# Patient Record
Sex: FEMALE | Race: WHITE | NOT HISPANIC OR LATINO | Employment: PART TIME | URBAN - METROPOLITAN AREA
[De-identification: names, ages, dates, MRNs, and addresses within clinical notes are randomized per-mention and may not be internally consistent; named-entity substitution may affect disease eponyms.]

---

## 2018-05-06 ENCOUNTER — APPOINTMENT (OUTPATIENT)
Dept: RADIOLOGY | Facility: CLINIC | Age: 43
End: 2018-05-06
Payer: COMMERCIAL

## 2018-05-06 ENCOUNTER — OFFICE VISIT (OUTPATIENT)
Dept: URGENT CARE | Facility: CLINIC | Age: 43
End: 2018-05-06
Payer: COMMERCIAL

## 2018-05-06 VITALS
RESPIRATION RATE: 16 BRPM | SYSTOLIC BLOOD PRESSURE: 114 MMHG | WEIGHT: 149.8 LBS | OXYGEN SATURATION: 100 % | HEART RATE: 83 BPM | TEMPERATURE: 98.7 F | DIASTOLIC BLOOD PRESSURE: 68 MMHG | HEIGHT: 63 IN | BODY MASS INDEX: 26.54 KG/M2

## 2018-05-06 DIAGNOSIS — S69.92XA INJURY OF LEFT RING FINGER, INITIAL ENCOUNTER: ICD-10-CM
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
## 2018-05-06 DIAGNOSIS — S69.92XA INJURY OF LEFT RING FINGER, INITIAL ENCOUNTER: Primary | ICD-10-CM

## 2018-05-06 PROCEDURE — 99203 OFFICE O/P NEW LOW 30 MIN: CPT | Performed by: PHYSICIAN ASSISTANT

## 2018-05-06 PROCEDURE — 73140 X-RAY EXAM OF FINGER(S): CPT

## 2018-05-06 NOTE — PROGRESS NOTES
[facility]        NAME: Vonnie Ruiz is a 38 y o  female  : 1975    MRN: [id number]  DATE: May 6, 2018  TIME: 9:34 AM    Assessment and Plan   Injury of left ring finger, initial encounter [[id number]]  1  Injury of left ring finger, initial encounter  XR finger left fourth digit-ring    Ambulatory referral to Orthopedic Surgery   2  Injury due to impact of moving object with stationary subject  XR finger left fourth digit-ring    Ambulatory referral to Orthopedic Surgery         Patient Instructions     Discussed condition with pt  Her X-ray appears negative for fracture or dislocation  I rec that she take NSAIDs for residual swelling, practice gentle AROM exercises throughout the day, and I gave her contact info to schedule consult with ortho should this not completely resolve  I also gave her handout to establish with local PCP in the area  Follow up with PCP in 3-5 days  Proceed to  ER if symptoms worsen  Chief Complaint     Chief Complaint   Patient presents with    Hand Pain     Pt here  for pain  in left ring finger x 1month, Pt was playing football  Pt states it was very swollen  but now it is still swollen and tender,  not able to bend all the way  History of Present Illness       Pt presents with what she reports is a left ring finger injury which occurred 1 month ago  Was playing football with her son and the ball hit her on the dorsal PIP joint and the finger and joint swelled up and she still feels after all this time that it is swollen and she does not have full ROM  Denies numbness  Review of Systems   Review of Systems   Constitutional: Negative  Respiratory: Negative  Cardiovascular: Negative  Gastrointestinal: Negative  Genitourinary: Negative  Musculoskeletal:        Pain and swelling left ring finger PIP S/P injury         Current Medications     No current outpatient prescriptions on file      Current Allergies     Allergies as of 05/06/2018    (No Known Allergies)            The following portions of the patient's history were reviewed and updated as appropriate: allergies, current medications, past family history, past medical history, past social history, past surgical history and problem list      History reviewed  No pertinent past medical history  Past Surgical History:   Procedure Laterality Date    KNEE ARTHROSCOPY      right knee       Family History   Problem Relation Age of Onset    No Known Problems Mother     No Known Problems Father          Medications have been verified  Objective   /68 (BP Location: Right arm, Patient Position: Sitting, Cuff Size: Standard)   Pulse 83   Temp 98 7 °F (37 1 °C)   Resp 16   Ht 5' 3" (1 6 m)   Wt 67 9 kg (149 lb 12 8 oz)   SpO2 100%   BMI 26 54 kg/m²        Physical Exam     Physical Exam   Constitutional: She is oriented to person, place, and time  She appears well-developed and well-nourished  No distress  Musculoskeletal:   TTP and mild localized STS over left ring finger PIP joint and middle phalanx with inability to fully flex at this joint  No ecchymosis  Neurological: She is alert and oriented to person, place, and time  No sensory deficit  Psychiatric: She has a normal mood and affect  Vitals reviewed